# Patient Record
Sex: MALE | Race: WHITE | NOT HISPANIC OR LATINO
[De-identification: names, ages, dates, MRNs, and addresses within clinical notes are randomized per-mention and may not be internally consistent; named-entity substitution may affect disease eponyms.]

---

## 2017-01-03 ENCOUNTER — OTHER (OUTPATIENT)
Age: 57
End: 2017-01-03

## 2017-01-03 ENCOUNTER — APPOINTMENT (OUTPATIENT)
Dept: SURGICAL ONCOLOGY | Facility: CLINIC | Age: 57
End: 2017-01-03

## 2017-01-03 VITALS
DIASTOLIC BLOOD PRESSURE: 62 MMHG | HEIGHT: 66.5 IN | WEIGHT: 185 LBS | BODY MASS INDEX: 29.38 KG/M2 | HEART RATE: 90 BPM | SYSTOLIC BLOOD PRESSURE: 142 MMHG

## 2017-01-03 DIAGNOSIS — K82.3 FISTULA OF GALLBLADDER: ICD-10-CM

## 2017-02-22 ENCOUNTER — APPOINTMENT (OUTPATIENT)
Dept: UROLOGY | Facility: CLINIC | Age: 57
End: 2017-02-22

## 2017-04-11 ENCOUNTER — APPOINTMENT (OUTPATIENT)
Dept: SURGICAL ONCOLOGY | Facility: CLINIC | Age: 57
End: 2017-04-11

## 2017-04-26 LAB
PSA FREE FLD-MCNC: NORMAL %
PSA FREE SERPL-MCNC: <0.01 NG/ML
PSA SERPL-MCNC: 0.02 NG/ML

## 2017-06-05 ENCOUNTER — APPOINTMENT (OUTPATIENT)
Dept: UROLOGY | Facility: CLINIC | Age: 57
End: 2017-06-05

## 2018-03-12 ENCOUNTER — APPOINTMENT (OUTPATIENT)
Dept: UROLOGY | Facility: CLINIC | Age: 58
End: 2018-03-12

## 2018-05-09 ENCOUNTER — APPOINTMENT (OUTPATIENT)
Dept: UROLOGY | Facility: CLINIC | Age: 58
End: 2018-05-09

## 2018-10-08 ENCOUNTER — APPOINTMENT (OUTPATIENT)
Dept: UROLOGY | Facility: CLINIC | Age: 58
End: 2018-10-08
Payer: COMMERCIAL

## 2018-10-08 PROCEDURE — 99213 OFFICE O/P EST LOW 20 MIN: CPT

## 2018-10-10 LAB — PSA SERPL-MCNC: 0.01 NG/ML

## 2019-10-04 ENCOUNTER — APPOINTMENT (OUTPATIENT)
Dept: UROLOGY | Facility: CLINIC | Age: 59
End: 2019-10-04
Payer: COMMERCIAL

## 2019-10-04 PROCEDURE — 99213 OFFICE O/P EST LOW 20 MIN: CPT

## 2019-10-04 NOTE — PHYSICAL EXAM
[General Appearance - Well Developed] : well developed [General Appearance - Well Nourished] : well nourished [General Appearance - In No Acute Distress] : no acute distress [Abdomen Tenderness] : non-tender [Abdomen Soft] : soft [Costovertebral Angle Tenderness] : no ~M costovertebral angle tenderness [Skin Color & Pigmentation] : normal skin color and pigmentation [Edema] : no peripheral edema [Oriented To Time, Place, And Person] : oriented to person, place, and time [Exaggerated Use Of Accessory Muscles For Inspiration] : no accessory muscle use [No Focal Deficits] : no focal deficits [Normal Station and Gait] : the gait and station were normal for the patient's age [FreeTextEntry1] : obese, large well healed surgical scars

## 2019-10-04 NOTE — ASSESSMENT
[FreeTextEntry1] : [place] 7 prostate ca with ?JACK/R1. Needs PSA eval. Discussed potential role of adjuvant treatment. Reiterated need for close follow-up given path. Excellent urinary control and erections. \par --PSA today. Pt to call for results\par --RTC in 1y\par

## 2019-10-04 NOTE — HISTORY OF PRESENT ILLNESS
[FreeTextEntry1] : 60yo gentleman with cc of prostate ca. Pt reports that he has a strong family hx of prostate ca. Father with hx of prostate ca  at 67 and was met at dx. Brother is 59 and was dx with prostate ca and had surgery for this. As a result, he went to see a urologist and had a PSA from 10/10/15 was 6.1 ng/ml. This was repeated with me and PSA was noted to be 7.9. He underwent MRI that showed 2 suspicious lesions. He had biopsy that showed [place] 6 disease in multiple cores B. In 2016 he underwent RALRP. Path revealed pT2+ (JACK vs capsular incision) R1 [place] 3+4=7 prostate ca. He returns today for follow-up. Last PSA in Oct 2018 was 0.01. \par \par Last seen last year in Oct. Has not had PSA since. Advised 6mo follow-up but he returns today. No urinary incontinence. He has occasional dribble at end of urination. \par \par Prior to surgery he reports rigidity about 70% and now he states this is completely back to baseline. He has no complaints. He is not using any meds. He is now on the road a lot as a .

## 2019-10-07 LAB — PSA SERPL-MCNC: 0.02 NG/ML

## 2020-09-14 ENCOUNTER — APPOINTMENT (OUTPATIENT)
Dept: UROLOGY | Facility: CLINIC | Age: 60
End: 2020-09-14
Payer: COMMERCIAL

## 2020-09-14 PROCEDURE — 99213 OFFICE O/P EST LOW 20 MIN: CPT

## 2020-09-14 NOTE — HISTORY OF PRESENT ILLNESS
[FreeTextEntry1] : 59yo gentleman with cc of prostate ca. Pt reports that he has a strong family hx of prostate ca. Father with hx of prostate ca  at 67 and was met at dx. Brother is 59 and was dx with prostate ca and had surgery for this. As a result, he went to see a urologist and had a PSA from 10/10/15 was 6.1 ng/ml. This was repeated with me and PSA was noted to be 7.9. He underwent MRI that showed 2 suspicious lesions. He had biopsy that showed [place] 6 disease in multiple cores B. In 2016 he underwent RALRP. Path revealed pT2+ (JACK vs capsular incision) R1 [place] 3+4=7 prostate ca. He returns today for follow-up. Last PSA in Oct 2019 was 0.02. \par \par Last seen last year in Oct. Has not had PSA since. Advised 6mo follow-up but he returns today. No urinary incontinence. He has occasional dribble at end of urination. Prior to surgery he reports rigidity about 70% and now he states this is completely back to baseline. He has no complaints. He is not using any meds. 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 No new medical issues.

## 2020-09-14 NOTE — PHYSICAL EXAM
[General Appearance - Well Developed] : well developed [General Appearance - Well Nourished] : well nourished [General Appearance - In No Acute Distress] : no acute distress [Abdomen Soft] : soft [Skin Color & Pigmentation] : normal skin color and pigmentation [Abdomen Tenderness] : non-tender [Costovertebral Angle Tenderness] : no ~M costovertebral angle tenderness [Edema] : no peripheral edema [Exaggerated Use Of Accessory Muscles For Inspiration] : no accessory muscle use [Oriented To Time, Place, And Person] : oriented to person, place, and time [Normal Station and Gait] : the gait and station were normal for the patient's age [No Focal Deficits] : no focal deficits [FreeTextEntry1] : protuberant, large well healed surgical scars

## 2020-09-14 NOTE — ASSESSMENT
[FreeTextEntry1] : [place] 7 prostate ca with ?JACK/R1. Needs PSA eval. Discussed potential role of adjuvant treatment. Reiterated need for close follow-up given path. Excellent urinary control and erections. \par --PSA today. Pt to call for results\par --RTC in 1y\par

## 2020-09-16 LAB — PSA SERPL-MCNC: <0.01 NG/ML

## 2022-02-11 ENCOUNTER — APPOINTMENT (OUTPATIENT)
Dept: UROLOGY | Facility: CLINIC | Age: 62
End: 2022-02-11

## 2022-02-25 ENCOUNTER — APPOINTMENT (OUTPATIENT)
Dept: UROLOGY | Facility: CLINIC | Age: 62
End: 2022-02-25
Payer: COMMERCIAL

## 2022-02-25 VITALS
OXYGEN SATURATION: 93 % | RESPIRATION RATE: 16 BRPM | SYSTOLIC BLOOD PRESSURE: 124 MMHG | DIASTOLIC BLOOD PRESSURE: 79 MMHG | HEART RATE: 79 BPM

## 2022-02-25 PROCEDURE — 99214 OFFICE O/P EST MOD 30 MIN: CPT

## 2022-02-25 NOTE — ASSESSMENT
[FreeTextEntry1] : [place] 7 prostate ca with ?JACK/R1. Needs PSA eval. Discussed potential role of adjuvant treatment. Reiterated need for close follow-up given path. Excellent urinary control. Now interested in meds for erections. \par --PSA today. \par --Trial of viagra\par --RTC in 1y\par

## 2022-02-25 NOTE — PHYSICAL EXAM
[General Appearance - Well Developed] : well developed [General Appearance - Well Nourished] : well nourished [General Appearance - In No Acute Distress] : no acute distress [Abdomen Soft] : soft [Abdomen Tenderness] : non-tender [Costovertebral Angle Tenderness] : no ~M costovertebral angle tenderness [Skin Color & Pigmentation] : normal skin color and pigmentation [Edema] : no peripheral edema [Exaggerated Use Of Accessory Muscles For Inspiration] : no accessory muscle use [Oriented To Time, Place, And Person] : oriented to person, place, and time [Normal Station and Gait] : the gait and station were normal for the patient's age [No Focal Deficits] : no focal deficits [FreeTextEntry1] : protuberant, large well healed surgical scars

## 2022-02-25 NOTE — HISTORY OF PRESENT ILLNESS
[FreeTextEntry1] : 63yo gentleman with cc of prostate ca. Pt reports that he has a strong family hx of prostate ca. Father with hx of prostate ca  at 67 and was met at dx. Brother is 59 and was dx with prostate ca and had surgery for this. As a result, he went to see a urologist and had a PSA from 10/10/15 was 6.1 ng/ml. This was repeated with me and PSA was noted to be 7.9. He underwent MRI that showed 2 suspicious lesions. He had biopsy that showed [place] 6 disease in multiple cores B. In 2016 he underwent RALRP. Path revealed pT2+ (JACK vs capsular incision) R1 [place] 3+4=7 prostate ca. He returns today for follow-up. Last PSA in Oct 2019 was 0.02. \par \par Last seen 2020 and PSA was undetectable. No urinary incontinence. He has occasional dribble at end of urination. He has urinary frequency. He drinks 1 cup of coffee and has 8 glasses of water. Prior to surgery he reports rigidity about 70% and now he states this is completely back to baseline. He is interested in medications. He is unsure if he has really tried meds in the past. Reports decreased and earlier loss of rigidity. He is not using any meds. No new medical issues.

## 2022-03-01 ENCOUNTER — NON-APPOINTMENT (OUTPATIENT)
Age: 62
End: 2022-03-01

## 2022-03-01 LAB — PSA SERPL-MCNC: <0.01 NG/ML

## 2023-02-24 ENCOUNTER — APPOINTMENT (OUTPATIENT)
Dept: UROLOGY | Facility: CLINIC | Age: 63
End: 2023-02-24

## 2024-02-16 ENCOUNTER — APPOINTMENT (OUTPATIENT)
Dept: UROLOGY | Facility: CLINIC | Age: 64
End: 2024-02-16
Payer: MEDICARE

## 2024-02-16 VITALS
DIASTOLIC BLOOD PRESSURE: 77 MMHG | OXYGEN SATURATION: 96 % | SYSTOLIC BLOOD PRESSURE: 150 MMHG | HEART RATE: 74 BPM | RESPIRATION RATE: 16 BRPM

## 2024-02-16 DIAGNOSIS — C61 MALIGNANT NEOPLASM OF PROSTATE: ICD-10-CM

## 2024-02-16 DIAGNOSIS — N52.9 MALE ERECTILE DYSFUNCTION, UNSPECIFIED: ICD-10-CM

## 2024-02-16 DIAGNOSIS — R35.1 NOCTURIA: ICD-10-CM

## 2024-02-16 PROCEDURE — 99213 OFFICE O/P EST LOW 20 MIN: CPT

## 2024-02-16 PROCEDURE — G2211 COMPLEX E/M VISIT ADD ON: CPT

## 2024-02-16 RX ORDER — SILDENAFIL 100 MG/1
100 TABLET, FILM COATED ORAL
Qty: 6 | Refills: 11 | Status: DISCONTINUED | COMMUNITY
Start: 2022-02-25 | End: 2024-02-16

## 2024-02-22 NOTE — ASSESSMENT
[FreeTextEntry1] : [place] 7 prostate ca with ?JACK/R1 in 3/2016. Needs PSA eval. Reiterated need for close follow-up given path. Excellent urinary control. Now interested in meds for erections.  --PSA today.  --RTC in 1y with Dr Rubio

## 2024-02-22 NOTE — HISTORY OF PRESENT ILLNESS
[FreeTextEntry1] : 63yo gentleman with cc of prostate ca. Pt reports that he has a strong family hx of prostate ca. Father with hx of prostate ca  at 67 and was met at dx. Brother is 59 and was dx with prostate ca and had surgery for this. As a result, he went to see a urologist and had a PSA from 10/10/15 was 6.1 ng/ml. This was repeated with me and PSA was noted to be 7.9. He underwent MRI that showed 2 suspicious lesions. He had biopsy that showed [place] 6 disease in multiple cores B. In 2016 he underwent RALRP. Path revealed pT2+ (JACK vs capsular incision) R1 [place] 3+4=7 prostate ca. Last seen 2y ago. PSA <0.01 in 2022.   He returns today for follow-up. No recent PSAs. He has occasional dribble at end of urination. He has urinary frequency. He drinks 1 cup of coffee and has 8 glasses of water. Prior to surgery he reports rigidity about 70% and now he states this is completely back to baseline. Wife not interested so not very sexually active.   No new medical issues.

## 2024-02-23 LAB — PSA SERPL-MCNC: <0.01 NG/ML

## 2025-09-03 ENCOUNTER — EMERGENCY (EMERGENCY)
Facility: HOSPITAL | Age: 65
LOS: 1 days | End: 2025-09-03
Attending: STUDENT IN AN ORGANIZED HEALTH CARE EDUCATION/TRAINING PROGRAM
Payer: MEDICARE

## 2025-09-03 VITALS
HEIGHT: 67 IN | DIASTOLIC BLOOD PRESSURE: 61 MMHG | OXYGEN SATURATION: 97 % | RESPIRATION RATE: 18 BRPM | HEART RATE: 71 BPM | SYSTOLIC BLOOD PRESSURE: 160 MMHG | WEIGHT: 190.04 LBS | TEMPERATURE: 98 F

## 2025-09-03 VITALS — DIASTOLIC BLOOD PRESSURE: 60 MMHG | OXYGEN SATURATION: 96 % | SYSTOLIC BLOOD PRESSURE: 131 MMHG | HEART RATE: 60 BPM

## 2025-09-03 DIAGNOSIS — Z98.890 OTHER SPECIFIED POSTPROCEDURAL STATES: Chronic | ICD-10-CM

## 2025-09-03 DIAGNOSIS — Z90.79 ACQUIRED ABSENCE OF OTHER GENITAL ORGAN(S): Chronic | ICD-10-CM

## 2025-09-03 DIAGNOSIS — Z98.89 OTHER SPECIFIED POSTPROCEDURAL STATES: Chronic | ICD-10-CM

## 2025-09-03 LAB
ALBUMIN SERPL ELPH-MCNC: 3.8 G/DL — SIGNIFICANT CHANGE UP (ref 3.3–5.2)
ALP SERPL-CCNC: 84 U/L — SIGNIFICANT CHANGE UP (ref 40–120)
ALT FLD-CCNC: 19 U/L — SIGNIFICANT CHANGE UP
ANION GAP SERPL CALC-SCNC: 14 MMOL/L — SIGNIFICANT CHANGE UP (ref 5–17)
AST SERPL-CCNC: 22 U/L — SIGNIFICANT CHANGE UP
BASOPHILS # BLD AUTO: 0.05 K/UL — SIGNIFICANT CHANGE UP (ref 0–0.2)
BASOPHILS NFR BLD AUTO: 0.4 % — SIGNIFICANT CHANGE UP (ref 0–2)
BILIRUB SERPL-MCNC: 0.4 MG/DL — SIGNIFICANT CHANGE UP (ref 0.4–2)
BUN SERPL-MCNC: 19.4 MG/DL — SIGNIFICANT CHANGE UP (ref 8–20)
CALCIUM SERPL-MCNC: 9.2 MG/DL — SIGNIFICANT CHANGE UP (ref 8.4–10.5)
CHLORIDE SERPL-SCNC: 101 MMOL/L — SIGNIFICANT CHANGE UP (ref 96–108)
CO2 SERPL-SCNC: 25 MMOL/L — SIGNIFICANT CHANGE UP (ref 22–29)
CREAT SERPL-MCNC: 1.09 MG/DL — SIGNIFICANT CHANGE UP (ref 0.5–1.3)
EGFR: 74 ML/MIN/1.73M2 — SIGNIFICANT CHANGE UP
EGFR: 74 ML/MIN/1.73M2 — SIGNIFICANT CHANGE UP
EOSINOPHIL # BLD AUTO: 0.6 K/UL — HIGH (ref 0–0.5)
EOSINOPHIL NFR BLD AUTO: 4.7 % — SIGNIFICANT CHANGE UP (ref 0–6)
GLUCOSE SERPL-MCNC: 117 MG/DL — HIGH (ref 70–99)
HCT VFR BLD CALC: 38.8 % — LOW (ref 39–50)
HGB BLD-MCNC: 12.6 G/DL — LOW (ref 13–17)
IMM GRANULOCYTES # BLD AUTO: 0.03 K/UL — SIGNIFICANT CHANGE UP (ref 0–0.07)
IMM GRANULOCYTES NFR BLD AUTO: 0.2 % — SIGNIFICANT CHANGE UP (ref 0–0.9)
LYMPHOCYTES # BLD AUTO: 2.78 K/UL — SIGNIFICANT CHANGE UP (ref 1–3.3)
LYMPHOCYTES NFR BLD AUTO: 21.9 % — SIGNIFICANT CHANGE UP (ref 13–44)
MCHC RBC-ENTMCNC: 26.5 PG — LOW (ref 27–34)
MCHC RBC-ENTMCNC: 32.5 G/DL — SIGNIFICANT CHANGE UP (ref 32–36)
MCV RBC AUTO: 81.7 FL — SIGNIFICANT CHANGE UP (ref 80–100)
MONOCYTES # BLD AUTO: 0.86 K/UL — SIGNIFICANT CHANGE UP (ref 0–0.9)
MONOCYTES NFR BLD AUTO: 6.8 % — SIGNIFICANT CHANGE UP (ref 2–14)
NEUTROPHILS # BLD AUTO: 8.35 K/UL — HIGH (ref 1.8–7.4)
NEUTROPHILS NFR BLD AUTO: 66 % — SIGNIFICANT CHANGE UP (ref 43–77)
NRBC # BLD AUTO: 0 K/UL — SIGNIFICANT CHANGE UP (ref 0–0)
NRBC # FLD: 0 K/UL — SIGNIFICANT CHANGE UP (ref 0–0)
NRBC BLD AUTO-RTO: 0 /100 WBCS — SIGNIFICANT CHANGE UP (ref 0–0)
PLATELET # BLD AUTO: 259 K/UL — SIGNIFICANT CHANGE UP (ref 150–400)
PMV BLD: 9.7 FL — SIGNIFICANT CHANGE UP (ref 7–13)
POTASSIUM SERPL-MCNC: 3.6 MMOL/L — SIGNIFICANT CHANGE UP (ref 3.5–5.3)
POTASSIUM SERPL-SCNC: 3.6 MMOL/L — SIGNIFICANT CHANGE UP (ref 3.5–5.3)
PROT SERPL-MCNC: 6.6 G/DL — SIGNIFICANT CHANGE UP (ref 6.6–8.7)
RBC # BLD: 4.75 M/UL — SIGNIFICANT CHANGE UP (ref 4.2–5.8)
RBC # FLD: 13.6 % — SIGNIFICANT CHANGE UP (ref 10.3–14.5)
SODIUM SERPL-SCNC: 140 MMOL/L — SIGNIFICANT CHANGE UP (ref 135–145)
TROPONIN T, HIGH SENSITIVITY RESULT: 20 NG/L — SIGNIFICANT CHANGE UP
TROPONIN T, HIGH SENSITIVITY RESULT: 30 NG/L — HIGH
WBC # BLD: 12.67 K/UL — HIGH (ref 3.8–10.5)
WBC # FLD AUTO: 12.67 K/UL — HIGH (ref 3.8–10.5)

## 2025-09-03 PROCEDURE — 73060 X-RAY EXAM OF HUMERUS: CPT

## 2025-09-03 PROCEDURE — 71046 X-RAY EXAM CHEST 2 VIEWS: CPT | Mod: 26

## 2025-09-03 PROCEDURE — 73030 X-RAY EXAM OF SHOULDER: CPT | Mod: 26,LT

## 2025-09-03 PROCEDURE — 99285 EMERGENCY DEPT VISIT HI MDM: CPT | Mod: 25

## 2025-09-03 PROCEDURE — 71046 X-RAY EXAM CHEST 2 VIEWS: CPT

## 2025-09-03 PROCEDURE — 93010 ELECTROCARDIOGRAM REPORT: CPT

## 2025-09-03 PROCEDURE — 85025 COMPLETE CBC W/AUTO DIFF WBC: CPT

## 2025-09-03 PROCEDURE — 73030 X-RAY EXAM OF SHOULDER: CPT

## 2025-09-03 PROCEDURE — 96374 THER/PROPH/DIAG INJ IV PUSH: CPT

## 2025-09-03 PROCEDURE — 93005 ELECTROCARDIOGRAM TRACING: CPT

## 2025-09-03 PROCEDURE — 80053 COMPREHEN METABOLIC PANEL: CPT

## 2025-09-03 PROCEDURE — 84484 ASSAY OF TROPONIN QUANT: CPT

## 2025-09-03 PROCEDURE — 36415 COLL VENOUS BLD VENIPUNCTURE: CPT

## 2025-09-03 PROCEDURE — 73060 X-RAY EXAM OF HUMERUS: CPT | Mod: 26,LT

## 2025-09-03 PROCEDURE — 99285 EMERGENCY DEPT VISIT HI MDM: CPT

## 2025-09-03 RX ORDER — METHOCARBAMOL 500 MG/1
2 TABLET, FILM COATED ORAL
Refills: 0
Start: 2025-09-03

## 2025-09-03 RX ORDER — KETOROLAC TROMETHAMINE 30 MG/ML
15 INJECTION, SOLUTION INTRAMUSCULAR; INTRAVENOUS ONCE
Refills: 0 | Status: DISCONTINUED | OUTPATIENT
Start: 2025-09-03 | End: 2025-09-03

## 2025-09-03 RX ORDER — LIDOCAINE HYDROCHLORIDE 20 MG/ML
1 JELLY TOPICAL ONCE
Refills: 0 | Status: COMPLETED | OUTPATIENT
Start: 2025-09-03 | End: 2025-09-03

## 2025-09-03 RX ORDER — METHOCARBAMOL 500 MG/1
1 TABLET, FILM COATED ORAL
Qty: 9 | Refills: 0
Start: 2025-09-03 | End: 2025-09-05

## 2025-09-03 RX ORDER — METHOCARBAMOL 500 MG/1
1 TABLET, FILM COATED ORAL
Qty: 12 | Refills: 0
Start: 2025-09-03 | End: 2025-09-05

## 2025-09-03 RX ORDER — METHOCARBAMOL 500 MG/1
500 TABLET, FILM COATED ORAL ONCE
Refills: 0 | Status: COMPLETED | OUTPATIENT
Start: 2025-09-03 | End: 2025-09-03

## 2025-09-03 RX ADMIN — LIDOCAINE HYDROCHLORIDE 1 PATCH: 20 JELLY TOPICAL at 14:39

## 2025-09-03 RX ADMIN — METHOCARBAMOL 500 MILLIGRAM(S): 500 TABLET, FILM COATED ORAL at 14:39

## 2025-09-03 RX ADMIN — KETOROLAC TROMETHAMINE 15 MILLIGRAM(S): 30 INJECTION, SOLUTION INTRAMUSCULAR; INTRAVENOUS at 14:39
